# Patient Record
Sex: MALE | Race: WHITE | NOT HISPANIC OR LATINO | URBAN - METROPOLITAN AREA
[De-identification: names, ages, dates, MRNs, and addresses within clinical notes are randomized per-mention and may not be internally consistent; named-entity substitution may affect disease eponyms.]

---

## 2022-10-04 ENCOUNTER — WEB ENCOUNTER (OUTPATIENT)
Dept: URBAN - METROPOLITAN AREA CLINIC 94 | Facility: CLINIC | Age: 82
End: 2022-10-04

## 2022-10-04 ENCOUNTER — OFFICE VISIT (OUTPATIENT)
Dept: URBAN - METROPOLITAN AREA CLINIC 94 | Facility: CLINIC | Age: 82
End: 2022-10-04
Payer: MEDICARE

## 2022-10-04 VITALS
HEIGHT: 74 IN | HEART RATE: 99 BPM | WEIGHT: 244 LBS | SYSTOLIC BLOOD PRESSURE: 164 MMHG | DIASTOLIC BLOOD PRESSURE: 82 MMHG | TEMPERATURE: 97.3 F | BODY MASS INDEX: 31.32 KG/M2

## 2022-10-04 DIAGNOSIS — R63.4 WEIGHT LOSS: ICD-10-CM

## 2022-10-04 DIAGNOSIS — R11.0 NAUSEA: ICD-10-CM

## 2022-10-04 DIAGNOSIS — R19.7 DIARRHEA, UNSPECIFIED TYPE: ICD-10-CM

## 2022-10-04 PROCEDURE — 99204 OFFICE O/P NEW MOD 45 MIN: CPT | Performed by: PHYSICIAN ASSISTANT

## 2022-10-04 RX ORDER — ONDANSETRON HYDROCHLORIDE 4 MG/1
1 TABLET TABLET, FILM COATED ORAL BID
Qty: 60 TABLET | Refills: 0 | OUTPATIENT
Start: 2022-10-04

## 2022-10-04 RX ORDER — CHOLESTYRAMINE 4 G/9G
1 PACKET MIXED WITH WATER OR NON-CARBONATED DRINK POWDER, FOR SUSPENSION ORAL
Qty: 60 | Refills: 3 | OUTPATIENT
Start: 2022-10-04

## 2022-10-04 NOTE — PHYSICAL EXAM GASTROINTESTINAL
Abdomen , soft, nontender, abdominal hernia, nondistended , no guarding or rigidity , no masses palpable , normal bowel sounds , Liver and Spleen , no hepatomegaly present , no hepatosplenomegaly , liver nontender , spleen not palpable

## 2022-10-04 NOTE — HPI-TODAY'S VISIT:
82 year old male evaluated today for 1 mos hx of diarrhea.   Pt recently admitted to [facility] from 9/19-9/23 for diarrhea, DIAMOND, hyponatremia and hypokelmia. Pt sx had been present for 2 weeks prior to admission. Pt denies associated abdominal pain but reports decreased appetite.  CT 9/20 Findings suggestive of diarrheal illness. CT 9/26 There is gas and fluid in the colon consistent with diarrhea state. No focal wall inflammation is seen. GI PCR negative  On Discharge -Hgb 12.7, WBC 11.7   Since his discharge, diarrhea returned and back to ER on 9/26 CT 9/26 There is gas and fluid in the colon consistent with diarrhea state. No focal wall inflammation is seen  labs Hgb 12.7, WBC 11.7  Questran was prescribed   So far Questran 1/2 packet BID has not stopped diarrhea. Pt still reports  10-15 + stools/day with urgency and bowel accidents and nocturnal defecation 2-3 times. PT denies melena or hematochezia. Pt still denies abdominal pain but lately has developed nausea without vomiting. Still with decreased appetite.  Tried Imodium and pepto bismol without change.  Reports a 20 lb wt loss during this time.   Pt denies infection, travel out of the country, abx use, or new medications. Pt denies hx of DM.  Last colonoscopy  >5 yrs ago with hx of polyps.

## 2022-10-05 LAB
A/G RATIO: 1.6
ABSOLUTE BASOPHILS: 27
ABSOLUTE EOSINOPHILS: 456
ABSOLUTE LYMPHOCYTES: 1149
ABSOLUTE MONOCYTES: 721
ABSOLUTE NEUTROPHILS: 4447
ALBUMIN: 4.4
ALKALINE PHOSPHATASE: 85
ALT (SGPT): 25
AST (SGOT): 20
BASOPHILS: 0.4
BILIRUBIN, TOTAL: 0.3
BUN/CREATININE RATIO: 9
BUN: 25
C-REACTIVE PROTEIN, QUANT: 20.8
CALCIUM: 9.3
CARBON DIOXIDE, TOTAL: 18
CHLORIDE: 104
CREATININE: 2.65
EGFR: 23
EOSINOPHILS: 6.7
GLOBULIN, TOTAL: 2.7
GLUCOSE: 109
HEMATOCRIT: 35.4
HEMOGLOBIN: 12.6
IMMUNOGLOBULIN A, QN, SERUM: 138
INTERPRETATION: (no result)
LYMPHOCYTES: 16.9
MCH: 32.4
MCHC: 35.6
MCV: 91
MONOCYTES: 10.6
MPV: 9.3
NEUTROPHILS: 65.4
PLATELET COUNT: 252
POTASSIUM: 3.4
PROTEIN, TOTAL: 7.1
RDW: 14.6
RED BLOOD CELL COUNT: 3.89
SED RATE BY MODIFIED: 65
SODIUM: 135
T-TRANSGLUTAMINASE (TTG) IGA: <1
WHITE BLOOD CELL COUNT: 6.8

## 2022-10-11 ENCOUNTER — OFFICE VISIT (OUTPATIENT)
Dept: URBAN - METROPOLITAN AREA SURGERY CENTER 17 | Facility: SURGERY CENTER | Age: 82
End: 2022-10-11
Payer: MEDICARE

## 2022-10-11 ENCOUNTER — OFFICE VISIT (OUTPATIENT)
Dept: URBAN - METROPOLITAN AREA CLINIC 94 | Facility: CLINIC | Age: 82
End: 2022-10-11

## 2022-10-11 ENCOUNTER — CLAIMS CREATED FROM THE CLAIM WINDOW (OUTPATIENT)
Dept: URBAN - METROPOLITAN AREA CLINIC 4 | Facility: CLINIC | Age: 82
End: 2022-10-11
Payer: MEDICARE

## 2022-10-11 DIAGNOSIS — D12.0 ADENOMA OF CECUM: ICD-10-CM

## 2022-10-11 DIAGNOSIS — D12.3 ADENOMA OF TRANSVERSE COLON: ICD-10-CM

## 2022-10-11 DIAGNOSIS — R19.7 ACUTE DIARRHEA: ICD-10-CM

## 2022-10-11 DIAGNOSIS — D12.0 BENIGN NEOPLASM OF CECUM: ICD-10-CM

## 2022-10-11 DIAGNOSIS — D17.5 BENIGN LIPOMATOUS NEOPLASM OF INTRA-ABDOMINAL ORGANS: ICD-10-CM

## 2022-10-11 DIAGNOSIS — D12.2 ADENOMA OF ASCENDING COLON: ICD-10-CM

## 2022-10-11 DIAGNOSIS — D12.4 ADENOMA OF DESCENDING COLON: ICD-10-CM

## 2022-10-11 DIAGNOSIS — D12.4 BENIGN NEOPLASM OF DESCENDING COLON: ICD-10-CM

## 2022-10-11 DIAGNOSIS — D12.3 BENIGN NEOPLASM OF TRANSVERSE COLON: ICD-10-CM

## 2022-10-11 DIAGNOSIS — D17.9 BENIGN LIPOMATOUS NEOPLASM, UNSPECIFIED: ICD-10-CM

## 2022-10-11 DIAGNOSIS — D12.2 BENIGN NEOPLASM OF ASCENDING COLON: ICD-10-CM

## 2022-10-11 DIAGNOSIS — K52.832 LYMPHOCYTIC  COLITIS: ICD-10-CM

## 2022-10-11 DIAGNOSIS — K52.832 LYMPHOCYTIC COLITIS: ICD-10-CM

## 2022-10-11 LAB
CALPROTECTIN, FECAL: 15
GIARDIA LAMBLIA AG, EIA: (no result)
OVA AND PARASITES, CONC AND PERM SMEAR: (no result)
PANCREATIC ELASTASE, FECAL: 159

## 2022-10-11 PROCEDURE — 45385 COLONOSCOPY W/LESION REMOVAL: CPT | Performed by: INTERNAL MEDICINE

## 2022-10-11 PROCEDURE — 88342 IMHCHEM/IMCYTCHM 1ST ANTB: CPT | Performed by: PATHOLOGY

## 2022-10-11 PROCEDURE — 45380 COLONOSCOPY AND BIOPSY: CPT | Performed by: INTERNAL MEDICINE

## 2022-10-11 PROCEDURE — G8907 PT DOC NO EVENTS ON DISCHARG: HCPCS | Performed by: INTERNAL MEDICINE

## 2022-10-11 PROCEDURE — 88305 TISSUE EXAM BY PATHOLOGIST: CPT | Performed by: PATHOLOGY

## 2022-10-11 PROCEDURE — 88313 SPECIAL STAINS GROUP 2: CPT | Performed by: PATHOLOGY

## 2022-10-11 RX ORDER — CHOLESTYRAMINE 4 G/9G
1 PACKET MIXED WITH WATER OR NON-CARBONATED DRINK POWDER, FOR SUSPENSION ORAL
Qty: 60 | Refills: 3 | Status: ACTIVE | COMMUNITY
Start: 2022-10-04

## 2022-10-11 RX ORDER — ONDANSETRON HYDROCHLORIDE 4 MG/1
1 TABLET TABLET, FILM COATED ORAL BID
Qty: 60 TABLET | Refills: 0 | Status: ACTIVE | COMMUNITY
Start: 2022-10-04

## 2022-10-17 ENCOUNTER — OFFICE VISIT (OUTPATIENT)
Dept: URBAN - METROPOLITAN AREA CLINIC 94 | Facility: CLINIC | Age: 82
End: 2022-10-17
Payer: MEDICARE

## 2022-10-17 VITALS
SYSTOLIC BLOOD PRESSURE: 159 MMHG | HEIGHT: 74 IN | BODY MASS INDEX: 31.83 KG/M2 | TEMPERATURE: 97.3 F | DIASTOLIC BLOOD PRESSURE: 69 MMHG | WEIGHT: 248 LBS | HEART RATE: 65 BPM

## 2022-10-17 DIAGNOSIS — R63.0 DECREASED APPETITE: ICD-10-CM

## 2022-10-17 DIAGNOSIS — R79.82 ELEVATED C-REACTIVE PROTEIN (CRP): ICD-10-CM

## 2022-10-17 DIAGNOSIS — R19.7 DIARRHEA, UNSPECIFIED TYPE: ICD-10-CM

## 2022-10-17 PROCEDURE — 99213 OFFICE O/P EST LOW 20 MIN: CPT | Performed by: PHYSICIAN ASSISTANT

## 2022-10-17 RX ORDER — CHOLESTYRAMINE 4 G/9G
1 PACKET MIXED WITH WATER OR NON-CARBONATED DRINK POWDER, FOR SUSPENSION ORAL
Qty: 60 | Refills: 3 | Status: ACTIVE | COMMUNITY
Start: 2022-10-04

## 2022-10-17 RX ORDER — ONDANSETRON HYDROCHLORIDE 4 MG/1
1 TABLET TABLET, FILM COATED ORAL BID
Qty: 60 TABLET | Refills: 0 | Status: ACTIVE | COMMUNITY
Start: 2022-10-04

## 2022-10-18 LAB
C-REACTIVE PROTEIN, QUANT: 7.5
SED RATE BY MODIFIED: 77

## 2022-10-19 PROBLEM — 31437008 LYMPHOCYTIC COLITIS: Status: ACTIVE | Noted: 2022-10-19

## 2022-10-24 ENCOUNTER — TELEPHONE ENCOUNTER (OUTPATIENT)
Dept: URBAN - METROPOLITAN AREA CLINIC 94 | Facility: CLINIC | Age: 82
End: 2022-10-24

## 2022-10-24 RX ORDER — BUDESONIDE 3 MG/1
3 CAPSULES DAILY X 8 WEEKS, 2 CAPSULES X 2 WEEKS, 1 CAPSULE X 2 WEEKS AND STOP CAPSULE ORAL ONCE A DAY
Qty: 230 | Refills: 0 | OUTPATIENT
Start: 2022-10-24

## 2022-10-26 ENCOUNTER — ERX REFILL RESPONSE (OUTPATIENT)
Dept: URBAN - METROPOLITAN AREA CLINIC 94 | Facility: CLINIC | Age: 82
End: 2022-10-26

## 2022-10-26 RX ORDER — CHOLESTYRAMINE 4 G/9G
1 PACKET MIXED WITH WATER OR NON-CARBONATED DRINK POWDER, FOR SUSPENSION ORAL
Qty: 60 | Refills: 3 | OUTPATIENT

## 2022-10-26 RX ORDER — CHOLESTYRAMINE POWDER FOR SUSPENSION 4 G/8.78G
TAKE 1 PACKET MIXED WITH WATER OR NON-CARBONATED DRINK ORALLY TWICE PER DAY FOR 30 DAY(S) POWDER, FOR SUSPENSION ORAL
Qty: 60 PACKET | Refills: 3 | OUTPATIENT

## 2022-11-04 ENCOUNTER — OFFICE VISIT (OUTPATIENT)
Dept: URBAN - METROPOLITAN AREA CLINIC 94 | Facility: CLINIC | Age: 82
End: 2022-11-04
Payer: MEDICARE

## 2022-11-04 VITALS
HEIGHT: 74 IN | DIASTOLIC BLOOD PRESSURE: 77 MMHG | HEART RATE: 7 BPM | BODY MASS INDEX: 32.98 KG/M2 | WEIGHT: 257 LBS | SYSTOLIC BLOOD PRESSURE: 156 MMHG | TEMPERATURE: 97.7 F

## 2022-11-04 DIAGNOSIS — D36.9 TUBULAR ADENOMA: ICD-10-CM

## 2022-11-04 DIAGNOSIS — R70.0 ELEVATED SED RATE: ICD-10-CM

## 2022-11-04 DIAGNOSIS — K52.832 LYMPHOCYTIC COLITIS: ICD-10-CM

## 2022-11-04 PROCEDURE — 99213 OFFICE O/P EST LOW 20 MIN: CPT | Performed by: PHYSICIAN ASSISTANT

## 2022-11-04 RX ORDER — CHOLESTYRAMINE POWDER FOR SUSPENSION 4 G/8.78G
TAKE 1 PACKET MIXED WITH WATER OR NON-CARBONATED DRINK ORALLY TWICE PER DAY FOR 30 DAY(S) POWDER, FOR SUSPENSION ORAL
Qty: 60 PACKET | Refills: 3 | Status: ACTIVE | COMMUNITY

## 2022-11-04 RX ORDER — ESOMEPRAZOLE MAGNESIUM 40 MG/1
1 CAPSULE CAPSULE, DELAYED RELEASE ORAL ONCE A DAY
Status: ACTIVE | COMMUNITY

## 2022-11-04 RX ORDER — SIMVASTATIN 40 MG
1 TABLET IN THE EVENING TABLET ORAL ONCE A DAY
Status: ACTIVE | COMMUNITY

## 2022-11-04 RX ORDER — LOSARTAN POTASSIUM 25 MG/1
1 TABLET TABLET ORAL ONCE A DAY
Status: ACTIVE | COMMUNITY

## 2022-11-04 RX ORDER — BUDESONIDE 3 MG/1
2 CAPSULES CAPSULE ORAL ONCE A DAY
Status: ACTIVE | COMMUNITY

## 2022-11-14 ENCOUNTER — OFFICE VISIT (OUTPATIENT)
Dept: URBAN - METROPOLITAN AREA CLINIC 94 | Facility: CLINIC | Age: 82
End: 2022-11-14

## 2022-11-28 ENCOUNTER — TELEPHONE ENCOUNTER (OUTPATIENT)
Dept: URBAN - METROPOLITAN AREA CLINIC 92 | Facility: CLINIC | Age: 82
End: 2022-11-28

## 2022-11-29 LAB
C-REACTIVE PROTEIN, QUANT: 1.7
SED RATE BY MODIFIED: 9

## 2022-12-05 ENCOUNTER — OFFICE VISIT (OUTPATIENT)
Dept: URBAN - METROPOLITAN AREA CLINIC 94 | Facility: CLINIC | Age: 82
End: 2022-12-05
Payer: MEDICARE

## 2022-12-05 VITALS
WEIGHT: 267 LBS | TEMPERATURE: 97.7 F | BODY MASS INDEX: 34.27 KG/M2 | DIASTOLIC BLOOD PRESSURE: 84 MMHG | HEIGHT: 74 IN | SYSTOLIC BLOOD PRESSURE: 164 MMHG | HEART RATE: 71 BPM

## 2022-12-05 DIAGNOSIS — K52.832 LYMPHOCYTIC COLITIS: ICD-10-CM

## 2022-12-05 PROCEDURE — 99213 OFFICE O/P EST LOW 20 MIN: CPT | Performed by: PHYSICIAN ASSISTANT

## 2022-12-05 RX ORDER — SIMVASTATIN 40 MG
1 TABLET IN THE EVENING TABLET ORAL ONCE A DAY
Status: ACTIVE | COMMUNITY

## 2022-12-05 RX ORDER — CHOLESTYRAMINE POWDER FOR SUSPENSION 4 G/8.78G
TAKE 1 PACKET MIXED WITH WATER OR NON-CARBONATED DRINK ORALLY TWICE PER DAY FOR 30 DAY(S) POWDER, FOR SUSPENSION ORAL
Qty: 60 PACKET | Refills: 3 | Status: ON HOLD | COMMUNITY

## 2022-12-05 RX ORDER — BUDESONIDE 3 MG/1
2 CAPSULES CAPSULE ORAL ONCE A DAY
Status: ACTIVE | COMMUNITY

## 2022-12-05 RX ORDER — ESOMEPRAZOLE MAGNESIUM 40 MG/1
1 CAPSULE CAPSULE, DELAYED RELEASE ORAL ONCE A DAY
Status: ACTIVE | COMMUNITY

## 2022-12-05 RX ORDER — LOSARTAN POTASSIUM 25 MG/1
1 TABLET TABLET ORAL ONCE A DAY
Status: ACTIVE | COMMUNITY

## 2022-12-05 NOTE — HPI-TODAY'S VISIT:
82 year old male evaluated today for post procedure f/u visit for Lymphocytic Colitis Pt accompanied by friend.   Since his last visit, patient continues to feel well. He denies diarrhea, abdominal pain and states that appetite has returned. Pt also had repeat labs - WSR/CRP are back to normal. Pt denies melena or hematochezia. He denies abdominal pain. Pt continues Budesonide 9 mg until 12/23 and will begin taper as directed. He remains off Cholestyramine. Pt does report peripheral edema   Colonoscopy - 10/11- ileum normal, Lipomatous IC valve, 5 mm TA cecum, (6) 5-7 mm TA ascending polyps, 10 mm TA ascending polyp, (3) 6-7 mm TA transverse, 10 mm TA transverse, 8 mm TA descending, Medium lipoma- Bx revealed a submucosal lipoma, Diverticulosis in sigmoid colon, normal mucosa and IH.  Random Bx x 2 reveals Lymphocytic Colitis  Labs revealed WSR 65, CRP 20, normal fecal calprotectin and moderately low fecal elastase. Celiac panel normal.  Repeat WSR 77, CRP 7.5 (10/2022)  Pt recently admitted to [facility] from 9/19-9/23 for diarrhea, DIAMOND, hyponatremia and hypokalemia CT 9/20 Findings suggestive of diarrheal illness. CT 9/26 There is gas and fluid in the colon consistent with diarrhea state. No focal wall inflammation is seen. GI PCR negative, ova parasites normal.   CT 9/26 There is gas and fluid in the colon consistent with diarrhea state. No focal wall inflammation is seen  labs Hgb 12.7, WBC 11.7  Questran was prescribed

## 2022-12-07 ENCOUNTER — ERX REFILL RESPONSE (OUTPATIENT)
Dept: URBAN - METROPOLITAN AREA CLINIC 94 | Facility: CLINIC | Age: 82
End: 2022-12-07

## 2022-12-07 RX ORDER — BUDESONIDE 3 MG/1
2 CAPSULES CAPSULE ORAL ONCE A DAY
OUTPATIENT

## 2022-12-07 RX ORDER — BUDESONIDE 3 MG/1
PLEASE SEE ATTACHED FOR DETAILED DIRECTIONS CAPSULE, GELATIN COATED ORAL
Qty: 168 CAPSULE | Refills: 1 | OUTPATIENT

## 2022-12-20 ENCOUNTER — TELEPHONE ENCOUNTER (OUTPATIENT)
Dept: URBAN - METROPOLITAN AREA CLINIC 94 | Facility: CLINIC | Age: 82
End: 2022-12-20

## 2022-12-27 ENCOUNTER — TELEPHONE ENCOUNTER (OUTPATIENT)
Dept: URBAN - METROPOLITAN AREA CLINIC 94 | Facility: CLINIC | Age: 82
End: 2022-12-27

## 2022-12-27 RX ORDER — BUDESONIDE 3 MG/1
3 CAPSULES DAILY X 4 WEEKS, 2 CAPSULES X 2 WEEK, 1 CAPSULE X 2 WEEKS AND STOP CAPSULE, GELATIN COATED ORAL
Qty: 105 | Refills: 0

## 2023-03-06 ENCOUNTER — OFFICE VISIT (OUTPATIENT)
Dept: URBAN - METROPOLITAN AREA CLINIC 94 | Facility: CLINIC | Age: 83
End: 2023-03-06
Payer: MEDICARE

## 2023-03-06 VITALS
TEMPERATURE: 97.3 F | HEART RATE: 89 BPM | HEIGHT: 74 IN | BODY MASS INDEX: 32.34 KG/M2 | SYSTOLIC BLOOD PRESSURE: 156 MMHG | DIASTOLIC BLOOD PRESSURE: 91 MMHG | WEIGHT: 252 LBS

## 2023-03-06 DIAGNOSIS — R63.0 DECREASED APPETITE: ICD-10-CM

## 2023-03-06 DIAGNOSIS — K52.832 LYMPHOCYTIC COLITIS: ICD-10-CM

## 2023-03-06 PROBLEM — 64379006: Status: ACTIVE | Noted: 2022-10-17

## 2023-03-06 PROBLEM — 119971000119104: Status: ACTIVE | Noted: 2022-10-17

## 2023-03-06 PROBLEM — 1187071008: Status: ACTIVE | Noted: 2022-11-04

## 2023-03-06 PROCEDURE — 99214 OFFICE O/P EST MOD 30 MIN: CPT | Performed by: PHYSICIAN ASSISTANT

## 2023-03-06 RX ORDER — SIMVASTATIN 40 MG
1 TABLET IN THE EVENING TABLET ORAL ONCE A DAY
Status: ACTIVE | COMMUNITY

## 2023-03-06 RX ORDER — LOSARTAN POTASSIUM 25 MG/1
1 TABLET TABLET ORAL ONCE A DAY
Status: ACTIVE | COMMUNITY

## 2023-03-06 RX ORDER — CHOLESTYRAMINE POWDER FOR SUSPENSION 4 G/8.78G
TAKE 1 PACKET MIXED WITH WATER OR NON-CARBONATED DRINK ORALLY TWICE PER DAY FOR 30 DAY(S) POWDER, FOR SUSPENSION ORAL
Qty: 60 PACKET | Refills: 3 | Status: ACTIVE | COMMUNITY

## 2023-03-06 NOTE — HPI-TODAY'S VISIT:
82 year old male evaluated today for post procedure f/u visit for Lymphocytic Colitis Pt accompanied by friend.   Since last visit, patient reports decreased appetite has returned. He thought the diarrhea would return but so far that has not been the case. He reported decreased appetite while suffering with diarrhea before. Pt denies abdominal pain, wt loss or N/V. He denies new medication or any change in diet. Pt had 1 episode of diarrhea yesterday for which he took Cholestyramine and he was fine. He otherwise denies any additional sx.   Colonoscopy - 10/11- ileum normal, Lipomatous IC valve, 5 mm TA cecum, (6) 5-7 mm TA ascending polyps, 10 mm TA ascending polyp, (3) 6-7 mm TA transverse, 10 mm TA transverse, 8 mm TA descending, Medium lipoma- Bx revealed a submucosal lipoma, Diverticulosis in sigmoid colon, normal mucosa and IH.  Random Bx x 2 reveals Lymphocytic Colitis  Labs revealed WSR 65, CRP 20, normal fecal calprotectin and moderately low fecal elastase. Celiac panel normal.  Repeat WSR 77, CRP 7.5 (10/2022)  [facility] from 9/19-9/23 for diarrhea, DIAMOND, hyponatremia and hypokalemia CT 9/20 Findings suggestive of diarrheal illness. CT 9/26 There is gas and fluid in the colon consistent with diarrhea state. No focal wall inflammation is seen. GI PCR negative, ova parasites normal.   CT 9/26 There is gas and fluid in the colon consistent with diarrhea state. No focal wall inflammation is seen  labs

## 2023-03-07 LAB
A/G RATIO: 1.9
ABSOLUTE BASOPHILS: 22
ABSOLUTE EOSINOPHILS: 173
ABSOLUTE LYMPHOCYTES: 848
ABSOLUTE MONOCYTES: 616
ABSOLUTE NEUTROPHILS: 3742
ALBUMIN: 3.9
ALKALINE PHOSPHATASE: 64
ALT (SGPT): 10
AST (SGOT): 13
BASOPHILS: 0.4
BILIRUBIN, TOTAL: 0.6
BUN/CREATININE RATIO: 11
BUN: 18
C-REACTIVE PROTEIN, QUANT: 8.2
CALCIUM: 9.8
CARBON DIOXIDE, TOTAL: 28
CHLORIDE: 99
CREATININE: 1.6
EGFR: 42
EOSINOPHILS: 3.2
GLOBULIN, TOTAL: 2.1
GLUCOSE: 109
HEMATOCRIT: 32.2
HEMOGLOBIN: 11.5
LYMPHOCYTES: 15.7
MCH: 33.4
MCHC: 35.7
MCV: 93.6
MONOCYTES: 11.4
MPV: 8.6
NEUTROPHILS: 69.3
PLATELET COUNT: 180
POTASSIUM: 4
PROTEIN, TOTAL: 6
RDW: 16.3
RED BLOOD CELL COUNT: 3.44
SED RATE BY MODIFIED: 22
SODIUM: 134
WHITE BLOOD CELL COUNT: 5.4

## 2023-03-17 ENCOUNTER — ERX REFILL RESPONSE (OUTPATIENT)
Dept: URBAN - METROPOLITAN AREA CLINIC 94 | Facility: CLINIC | Age: 83
End: 2023-03-17

## 2023-03-17 RX ORDER — CHOLESTYRAMINE POWDER FOR SUSPENSION 4 G/8.78G
TAKE 1 PACKET MIXED WITH WATER OR NON-CARBONATED DRINK ORALLY TWICE PER DAY FOR 30 DAY(S) POWDER, FOR SUSPENSION ORAL
Qty: 180 PACKET | Refills: 1 | OUTPATIENT

## 2023-03-17 RX ORDER — CHOLESTYRAMINE POWDER FOR SUSPENSION 4 G/8.78G
TAKE 1 PACKET MIXED WITH WATER OR NON-CARBONATED DRINK ORALLY TWICE PER DAY FOR 30 DAY(S) POWDER, FOR SUSPENSION ORAL
Qty: 60 PACKET | Refills: 3 | OUTPATIENT

## 2023-03-22 ENCOUNTER — TELEPHONE ENCOUNTER (OUTPATIENT)
Dept: URBAN - METROPOLITAN AREA CLINIC 35 | Facility: CLINIC | Age: 83
End: 2023-03-22

## 2023-03-22 RX ORDER — BUDESONIDE 3 MG/1
3 CAPSULES DAILY X 4 WEEKS, 2 CAPSULES X 2 WEEK, 1 CAPSULE X 2 WEEKS AND STOP CAPSULE, GELATIN COATED ORAL
Qty: 105 | Refills: 0

## 2023-04-14 ENCOUNTER — OFFICE VISIT (OUTPATIENT)
Dept: URBAN - METROPOLITAN AREA CLINIC 94 | Facility: CLINIC | Age: 83
End: 2023-04-14
Payer: MEDICARE

## 2023-04-14 VITALS
BODY MASS INDEX: 21.05 KG/M2 | TEMPERATURE: 97.5 F | DIASTOLIC BLOOD PRESSURE: 70 MMHG | SYSTOLIC BLOOD PRESSURE: 171 MMHG | HEIGHT: 74 IN | WEIGHT: 164 LBS | HEART RATE: 83 BPM

## 2023-04-14 DIAGNOSIS — K52.832 LYMPHOCYTIC COLITIS: ICD-10-CM

## 2023-04-14 DIAGNOSIS — R63.0 DECREASED APPETITE: ICD-10-CM

## 2023-04-14 DIAGNOSIS — R79.82 ELEVATED C-REACTIVE PROTEIN (CRP): ICD-10-CM

## 2023-04-14 DIAGNOSIS — R93.3 ABNORMAL CT SCAN, COLON: ICD-10-CM

## 2023-04-14 PROCEDURE — 99214 OFFICE O/P EST MOD 30 MIN: CPT | Performed by: PHYSICIAN ASSISTANT

## 2023-04-14 RX ORDER — BUDESONIDE 3 MG/1
3 CAPSULES DAILY X 4 WEEKS, 2 CAPSULES X 2 WEEK, 1 CAPSULE X 2 WEEKS AND STOP CAPSULE, GELATIN COATED ORAL
Qty: 105 | Refills: 0 | Status: ACTIVE | COMMUNITY

## 2023-04-14 RX ORDER — LOSARTAN POTASSIUM 25 MG/1
1 TABLET TABLET ORAL ONCE A DAY
Status: ACTIVE | COMMUNITY

## 2023-04-14 RX ORDER — SIMVASTATIN 40 MG
1 TABLET IN THE EVENING TABLET ORAL ONCE A DAY
Status: ACTIVE | COMMUNITY

## 2023-04-14 NOTE — HPI-TODAY'S VISIT:
82 year old male evaluated today for post procedure f/u visit for Lymphocytic Colitis   CT revealed possible mild colitis - rt colon, Colonic diverticular disease.    labs revealed mildly elevated CRP and WSR (see below).   Following his last visit and review of CT and labs, patient was initiated on Budesonide 9 mg x 8 weeks, 6 mg x 2 weeks, 3 mg x 2 weeks and stop. He is 2 weeks into 9 mg dosage and feeling much better. He has more energy and appetite has improved. He was having diarrhea but this has already improved with taking Cholestyramine. He has since taking Cholestyramine since restarting Budesonide. Pt otherwise denies GI sx of abdominal pain, melena or hematochezia.   Colonoscopy - 10/11- ileum normal, Lipomatous IC valve, 5 mm TA cecum, (6) 5-7 mm TA ascending polyps, 10 mm TA ascending polyp, (3) 6-7 mm TA transverse, 10 mm TA transverse, 8 mm TA descending, Medium lipoma- Bx revealed a submucosal lipoma, Diverticulosis in sigmoid colon, normal mucosa and IH.  Random Bx x 2 reveals Lymphocytic Colitis  Labs revealed WSR 65, CRP 20, normal fecal calprotectin and moderately low fecal elastase. Celiac panel normal.  Repeat WSR 77, CRP 7.5 (10/2022)  [facility] from 9/19-9/23 for diarrhea, DIAMOND, hyponatremia and hypokalemia CT 9/20 Findings suggestive of diarrheal illness. CT 9/26 There is gas and fluid in the colon consistent with diarrhea state. No focal wall inflammation is seen. GI PCR negative, ova parasites normal.   CT 9/26 There is gas and fluid in the colon consistent with diarrhea state. No focal wall inflammation is seen  labs

## 2023-04-20 ENCOUNTER — ERX REFILL RESPONSE (OUTPATIENT)
Dept: URBAN - METROPOLITAN AREA CLINIC 94 | Facility: CLINIC | Age: 83
End: 2023-04-20

## 2023-04-20 RX ORDER — BUDESONIDE 3 MG/1
3 CAPSULES DAILY X 4 WEEKS, 2 CAPSULES X 2 WEEK, 1 CAPSULE X 2 WEEKS AND STOP CAPSULE, GELATIN COATED ORAL
Qty: 105 | Refills: 0 | OUTPATIENT

## 2023-04-20 RX ORDER — BUDESONIDE 3 MG/1
3 CAPSULES DAILY X 4 WEEKS, 2 CAPSULES X 4 WEEK, 1 CAPSULE X 4 WEEKS AND STOP CAPSULE, GELATIN COATED ORAL ONCE A DAY
Qty: 168 | Refills: 0 | OUTPATIENT

## 2023-06-08 ENCOUNTER — LAB OUTSIDE AN ENCOUNTER (OUTPATIENT)
Dept: URBAN - METROPOLITAN AREA CLINIC 94 | Facility: CLINIC | Age: 83
End: 2023-06-08

## 2023-06-08 ENCOUNTER — TELEPHONE ENCOUNTER (OUTPATIENT)
Dept: URBAN - METROPOLITAN AREA CLINIC 94 | Facility: CLINIC | Age: 83
End: 2023-06-08

## 2023-06-14 ENCOUNTER — CLAIMS CREATED FROM THE CLAIM WINDOW (OUTPATIENT)
Dept: URBAN - METROPOLITAN AREA CLINIC 4 | Facility: CLINIC | Age: 83
End: 2023-06-14
Payer: MEDICARE

## 2023-06-14 ENCOUNTER — OFFICE VISIT (OUTPATIENT)
Dept: URBAN - METROPOLITAN AREA SURGERY CENTER 17 | Facility: SURGERY CENTER | Age: 83
End: 2023-06-14
Payer: MEDICARE

## 2023-06-14 DIAGNOSIS — D12.2 BENIGN NEOPLASM OF ASCENDING COLON: ICD-10-CM

## 2023-06-14 DIAGNOSIS — D12.2 ADENOMA OF ASCENDING COLON: ICD-10-CM

## 2023-06-14 DIAGNOSIS — D17.5 BENIGN LIPOMATOUS NEOPLASM OF INTRA-ABDOMINAL ORGANS: ICD-10-CM

## 2023-06-14 DIAGNOSIS — D12.8 ADENOMATOUS POLYP OF RECTUM: ICD-10-CM

## 2023-06-14 DIAGNOSIS — D12.4 ADENOMA OF DESCENDING COLON: ICD-10-CM

## 2023-06-14 DIAGNOSIS — K63.89 OTHER SPECIFIED DISEASES OF INTESTINE: ICD-10-CM

## 2023-06-14 DIAGNOSIS — K63.89 APPENDICITIS EPIPLOICA: ICD-10-CM

## 2023-06-14 PROCEDURE — 45385 COLONOSCOPY W/LESION REMOVAL: CPT | Performed by: INTERNAL MEDICINE

## 2023-06-14 PROCEDURE — 88313 SPECIAL STAINS GROUP 2: CPT | Performed by: PATHOLOGY

## 2023-06-14 PROCEDURE — 88305 TISSUE EXAM BY PATHOLOGIST: CPT | Performed by: PATHOLOGY

## 2023-06-14 PROCEDURE — 45380 COLONOSCOPY AND BIOPSY: CPT | Performed by: INTERNAL MEDICINE

## 2023-06-14 PROCEDURE — G8907 PT DOC NO EVENTS ON DISCHARG: HCPCS | Performed by: INTERNAL MEDICINE

## 2023-06-14 PROCEDURE — 88342 IMHCHEM/IMCYTCHM 1ST ANTB: CPT | Performed by: PATHOLOGY

## 2023-06-14 RX ORDER — LOSARTAN POTASSIUM 25 MG/1
1 TABLET TABLET ORAL ONCE A DAY
Status: ACTIVE | COMMUNITY

## 2023-06-14 RX ORDER — BUDESONIDE 3 MG/1
3 CAPSULES DAILY X 4 WEEKS, 2 CAPSULES X 4 WEEK, 1 CAPSULE X 4 WEEKS AND STOP CAPSULE, GELATIN COATED ORAL ONCE A DAY
Qty: 168 | Refills: 0 | Status: ACTIVE | COMMUNITY

## 2023-06-14 RX ORDER — SIMVASTATIN 40 MG
1 TABLET IN THE EVENING TABLET ORAL ONCE A DAY
Status: ACTIVE | COMMUNITY

## 2023-07-17 ENCOUNTER — DASHBOARD ENCOUNTERS (OUTPATIENT)
Age: 83
End: 2023-07-17

## 2023-07-17 ENCOUNTER — OFFICE VISIT (OUTPATIENT)
Dept: URBAN - METROPOLITAN AREA CLINIC 94 | Facility: CLINIC | Age: 83
End: 2023-07-17
Payer: MEDICARE

## 2023-07-17 VITALS
SYSTOLIC BLOOD PRESSURE: 138 MMHG | BODY MASS INDEX: 32.85 KG/M2 | WEIGHT: 256 LBS | HEART RATE: 78 BPM | DIASTOLIC BLOOD PRESSURE: 70 MMHG | TEMPERATURE: 98.1 F | HEIGHT: 74 IN

## 2023-07-17 DIAGNOSIS — R19.7 DIARRHEA, UNSPECIFIED TYPE: ICD-10-CM

## 2023-07-17 DIAGNOSIS — D12.4 ADENOMA OF DESCENDING COLON: ICD-10-CM

## 2023-07-17 DIAGNOSIS — D12.2 ADENOMA OF ASCENDING COLON: ICD-10-CM

## 2023-07-17 DIAGNOSIS — K52.832 LYMPHOCYTIC COLITIS: ICD-10-CM

## 2023-07-17 PROCEDURE — 99213 OFFICE O/P EST LOW 20 MIN: CPT | Performed by: PHYSICIAN ASSISTANT

## 2023-07-17 RX ORDER — SIMVASTATIN 40 MG
1 TABLET IN THE EVENING TABLET ORAL ONCE A DAY
Status: ACTIVE | COMMUNITY

## 2023-07-17 RX ORDER — LOSARTAN POTASSIUM 25 MG/1
1 TABLET TABLET ORAL ONCE A DAY
Status: ACTIVE | COMMUNITY

## 2023-07-17 RX ORDER — BUDESONIDE 3 MG/1
3 CAPSULES DAILY X 4 WEEKS, 2 CAPSULES X 4 WEEK, 1 CAPSULE X 4 WEEKS AND STOP CAPSULE, GELATIN COATED ORAL ONCE A DAY
Qty: 168 | Refills: 0 | Status: ACTIVE | COMMUNITY

## 2023-07-17 NOTE — HPI-TODAY'S VISIT:
82 year old male evaluated today for post procedure f/u visit for Lymphocytic Colitis   Colonoscopy- 6/2023- lipomatous IC vavle, (4) 3-5 mm Tubular Adenomas ascending, (4) 3-5 mm Tubular Adenomas transeverse colon, 4 mm Tubular Adenoma descending colon, 5 mm Tubular Adenoma rectum, medium lipoma transverse colon- Bx revealed normal colonic mucosa and no evidence of lipoma, diverticulosis, IH Random colon bx revealed no abnormalities.   CT revealed possible mild colitis - rt colon, Colonic diverticular disease.    labs revealed mildly elevated CRP and WSR (see below).   Today patient reports feeling much better. He has been off Budesonide x 3 weeks and denies return of sx specifically diarrhea. He denies abdomina pain, melena or hematochezia. Pt reprots improvement in appetite.   Colonoscopy - 10/11- ileum normal, Lipomatous IC valve, 5 mm TA cecum, (6) 5-7 mm TA ascending polyps, 10 mm TA ascending polyp, (3) 6-7 mm TA transverse, 10 mm TA transverse, 8 mm TA descending, Medium lipoma- Bx revealed a submucosal lipoma, Diverticulosis in sigmoid colon, normal mucosa and IH.  Random Bx x 2 reveals Lymphocytic Colitis  Labs revealed WSR 65, CRP 20, normal fecal calprotectin and moderately low fecal elastase. Celiac panel normal.  Repeat WSR 77, CRP 7.5 (10/2022)  [facility] from 9/19-9/23 for diarrhea, DIAMOND, hyponatremia and hypokalemia CT 9/20 Findings suggestive of diarrheal illness. CT 9/26 There is gas and fluid in the colon consistent with diarrhea state. No focal wall inflammation is seen. GI PCR negative, ova parasites normal.   CT 9/26 There is gas and fluid in the colon consistent with diarrhea state. No focal wall inflammation is seen  labs

## 2024-01-22 ENCOUNTER — OFFICE VISIT (OUTPATIENT)
Dept: URBAN - METROPOLITAN AREA CLINIC 94 | Facility: CLINIC | Age: 84
End: 2024-01-22

## 2024-01-22 RX ORDER — LOSARTAN POTASSIUM 25 MG/1
1 TABLET TABLET ORAL ONCE A DAY
Status: ACTIVE | COMMUNITY

## 2024-01-22 RX ORDER — BUDESONIDE 3 MG/1
3 CAPSULES DAILY X 4 WEEKS, 2 CAPSULES X 4 WEEK, 1 CAPSULE X 4 WEEKS AND STOP CAPSULE, GELATIN COATED ORAL ONCE A DAY
Qty: 168 | Refills: 0 | Status: ACTIVE | COMMUNITY

## 2024-01-22 RX ORDER — SIMVASTATIN 40 MG
1 TABLET IN THE EVENING TABLET ORAL ONCE A DAY
Status: ACTIVE | COMMUNITY